# Patient Record
Sex: MALE | URBAN - METROPOLITAN AREA
[De-identification: names, ages, dates, MRNs, and addresses within clinical notes are randomized per-mention and may not be internally consistent; named-entity substitution may affect disease eponyms.]

---

## 2020-12-15 ENCOUNTER — IMPORTED ENCOUNTER (OUTPATIENT)
Dept: URBAN - METROPOLITAN AREA CLINIC 9 | Facility: CLINIC | Age: 60
End: 2020-12-15

## 2020-12-15 PROBLEM — H25.13: Noted: 2020-12-15

## 2021-10-16 ASSESSMENT — VISUAL ACUITY
OS_CC: 20/20 SN
OD_CC: 20/20 SN
OD_SC: 20/40 SN
OS_SC: 20/30 - SN

## 2021-10-16 ASSESSMENT — TONOMETRY
OS_IOP_MMHG: 20
OD_IOP_MMHG: 20

## 2021-12-15 NOTE — PATIENT DISCUSSION
Proceed with surgery in OS only at this time. Continue to monitor in OD until bothersome and visually significant.

## 2021-12-15 NOTE — PATIENT DISCUSSION
The patient feels that the cataract is significantly impacting daily activities and has elected cataract surgery. The risks, benefits, and alternatives to surgery were discussed. The patient elects to proceed with surgery in left eye only. Confirmed that OS is dominant.

## 2021-12-15 NOTE — PATIENT DISCUSSION
The patient feels that the cataract is significantly impacting daily activities and has elected cataract surgery. The risks, benefits, and alternatives to surgery were discussed. The patient elects to proceed with surgery in the left eye only at this time. Follow OD.

## 2021-12-15 NOTE — PATIENT DISCUSSION
Retinal Tear and Detachment precautions reviewed and discussed with patient. Patient understands to return immediately for any concerning changes in vision.

## 2021-12-20 NOTE — PATIENT DISCUSSION
I have prescribed Omni PREDnisolone sodium phosphate-MOXIfloxacin-BROMfenac combination drop for use as  directed before and after cataract surgery.

## 2021-12-20 NOTE — PATIENT DISCUSSION
The patient has declined the option to have their refractive error corrected at the  time of cataract surgery. It was explained to the patient that there is a high probability that they will need glasses for  both distance and near vision. It was also explained to the patient that the decision to not have their refractive error  corrected at the time of cataract surgery is a matter of convenience, not quality of vision.

## 2021-12-20 NOTE — PATIENT DISCUSSION
PATIENT DECLINES HAVING THEIR REFRACTIVE ERROR SURGICALLY CORRECTED  AND UNDERSTANDS THEY MAY STILL NEED SPEC RX FOR DISTANCE AND NEAR VISION.

## 2023-01-03 ENCOUNTER — ESTABLISHED PATIENT (OUTPATIENT)
Dept: URBAN - METROPOLITAN AREA CLINIC 17 | Facility: CLINIC | Age: 63
End: 2023-01-03

## 2023-01-03 DIAGNOSIS — H25.13: ICD-10-CM

## 2023-01-03 PROCEDURE — 92015 DETERMINE REFRACTIVE STATE: CPT

## 2023-01-03 PROCEDURE — 92014 COMPRE OPH EXAM EST PT 1/>: CPT

## 2023-01-03 ASSESSMENT — VISUAL ACUITY
OD_SC: 20/20
OS_SC: 20/20

## 2023-01-03 ASSESSMENT — TONOMETRY
OS_IOP_MMHG: 15
OD_IOP_MMHG: 18

## 2023-01-12 NOTE — PATIENT DISCUSSION
The patient feels that the cataract is significantly impacting daily activities and has elected cataract surgery. The risks, benefits, and alternatives to surgery were discussed. The patient elects to proceed with surgery. Schedule phaco with IOL OD only. OS already done.